# Patient Record
Sex: FEMALE | Race: BLACK OR AFRICAN AMERICAN | NOT HISPANIC OR LATINO | URBAN - NONMETROPOLITAN AREA
[De-identification: names, ages, dates, MRNs, and addresses within clinical notes are randomized per-mention and may not be internally consistent; named-entity substitution may affect disease eponyms.]

---

## 2020-05-16 ENCOUNTER — IMPORTED ENCOUNTER (OUTPATIENT)
Dept: URBAN - NONMETROPOLITAN AREA CLINIC 1 | Facility: CLINIC | Age: 45
End: 2020-05-16

## 2020-05-16 PROBLEM — H52.4: Noted: 2020-05-16

## 2020-05-16 PROBLEM — G35: Noted: 2020-05-16

## 2020-05-16 PROBLEM — H20.13: Noted: 2020-05-16

## 2020-05-16 PROCEDURE — S0620 ROUTINE OPHTHALMOLOGICAL EXA: HCPCS

## 2020-05-16 NOTE — PATIENT DISCUSSION
Multiple Sclerosis - Discussed diagnosis in detail with patient - Discussed signs and symptoms associated - Patient states she had an episode of vision loss prior to being diagnosed with MS about 10-12 years ago. States she never saw an eye doctor at that time.  - Discussed with patient that it sounds like she had an episode of optic neuritis- Patient is seeing Dr. Everett Rodriguez in [facility]IENT[facility], [facility] Neurologist) will send notes- Continue to montior Possible Chronic Granulomatous Uveitis OU- Discussed diagnosis in detail with patient - Discussed signs and symptoms associated - Discussed with patient that I'm not sure if this is related to her MS or to something else but it appears that she has been having a  chronic problem- Mutton Fat KP's noted OU today - Patient states she has been experiencing light sensitivity for at least a year or so off and on- May consider treating with topical steroid but patient is not active at this time and denies ever experiencing redness associated w/ the light sensitivity - Patients primary care is through [address]- Suggest blood test/uveitic workup - MARIANA FTA RPR [id number] chest X-Ray PPD- Continue to montior Presbyopia OU - Discussed refractive status in detail - New glasses Rx given today - Continue to monitor

## 2020-06-29 ENCOUNTER — IMPORTED ENCOUNTER (OUTPATIENT)
Dept: URBAN - NONMETROPOLITAN AREA CLINIC 1 | Facility: CLINIC | Age: 45
End: 2020-06-29

## 2020-06-29 PROBLEM — H40.013: Noted: 2020-06-29

## 2020-06-29 PROBLEM — H20.13: Noted: 2020-06-29

## 2020-06-29 PROBLEM — G35: Noted: 2020-06-29

## 2020-06-29 PROBLEM — H52.4: Noted: 2020-06-29

## 2020-06-29 PROCEDURE — 99213 OFFICE O/P EST LOW 20 MIN: CPT

## 2020-06-29 PROCEDURE — 92133 CPTRZD OPH DX IMG PST SGM ON: CPT

## 2020-06-29 NOTE — PATIENT DISCUSSION
Possible Chronic Granulomatous Uveitis OU- Discussed diagnosis in detail with patient - Discussed signs and symptoms associated - Discussed with patient that I'm not sure if this is related to her MS or to something else but it appears that she has been having a  chronic problem- Mutton Fat KP's noted OU today - No papalliedema seen on today's exam- Patient states she has been experiencing light sensitivity for at least a year or so off and on- Patients primary care is through [address] [place] [address] blood test/uveitic workup - MARIANA FTA RPR [id number] chest X-Ray PPD- OCT done today shows Borderline Superior and Temporal NFL thinning amd OS shows Borderline Superior Temporal and Inferior NFL thinning - Start Pred Forte QID OU x 1 week then taper to TID x 1 week BID x 1 week QD x 1 week then stop - Continue to montior Borderline Glaucoma OU- Discussed diagnosis in detail with patient- IOP today 10 OU- Cup to Disc noted at OD . 65 and OS . 6 - Continue to monitorMultiple Sclerosis - Discussed diagnosis in detail with patient - Discussed signs and symptoms associated - Patient states she had an episode of vision loss prior to being diagnosed with MS about 10-12 years ago. States she never saw an eye doctor at that time.  - Discussed with patient that it sounds like she had an episode of optic neuritis- Patient is seeing Dr. Soco Sue in [facility], [facility] Neurologist) will send notes- Continue to montior Presbyopia OU - Discussed refractive status in detail - New glasses Rx given today - Continue to monitor; 's Notes: KENYA Zhang 74 6/29/20

## 2020-07-10 ENCOUNTER — IMPORTED ENCOUNTER (OUTPATIENT)
Dept: URBAN - NONMETROPOLITAN AREA CLINIC 1 | Facility: CLINIC | Age: 45
End: 2020-07-10

## 2020-07-10 PROCEDURE — 92012 INTRM OPH EXAM EST PATIENT: CPT

## 2020-07-10 NOTE — PATIENT DISCUSSION
Possible Chronic Granulomatous Uveitis OU- Discussed diagnosis in detail with patient - Discussed signs and symptoms associated - Discussed with patient that I'm not sure if this is related to her MS or to something else but it appears that she has been having a  chronic problem- Mutpam Fat KP's noted OU today resolving- No papalliedema seen on today's exam- Patient states she has been experiencing light sensitivity for at least a year or so off and on- Patients primary care is through [address] [place] [address] blood test/uveitic workup - MARIANA FTA RPR [id number] chest X-Ray PPD- Continue Pred Forte BID OU x 1 week then taper to QD x 1 week- Continue to montior - RTC in 2 weeks for follow up with Dr. Christell Goldberg- Discussed diagnosis in detail with patient- IOP today 17 OD and 18 OS. - Cup to Disc noted at OD . 65 and OS . 6 - Continue to monitorMultiple Sclerosis - Discussed diagnosis in detail with patient - Discussed signs and symptoms associated - Patient states she had an episode of vision loss prior to being diagnosed with MS about 10-12 years ago. States she never saw an eye doctor at that time.  - Discussed with patient that it sounds like she had an episode of optic neuritis- Patient is seeing Dr. Noemy Lebron in [facility], [facility] Neurologist) will send notes- Continue to montior Presbyopia OU - Discussed refractive status in detail - Continue to monitor; 's Notes: KENYA Zhang 74 6/29/20

## 2020-07-23 ENCOUNTER — IMPORTED ENCOUNTER (OUTPATIENT)
Dept: URBAN - NONMETROPOLITAN AREA CLINIC 1 | Facility: CLINIC | Age: 45
End: 2020-07-23

## 2020-07-23 PROCEDURE — 99213 OFFICE O/P EST LOW 20 MIN: CPT

## 2020-07-23 NOTE — PATIENT DISCUSSION
Possible Chronic Granulomatous Uveitis OU- Discussed diagnosis in detail with patient - Discussed signs and symptoms associated - Discussed with patient that I'm not sure if this is related to her MS or to something else but it appears that she has been having a  chronic problem- Mutton Fat KP's noted OU today resolving- No papalliedema seen on today's exam- Patient states she has been experiencing light sensitivity for at least a year or so off and on- Patients primary care is through [address] [place] [address] blood test/uveitic workup - MARIANA FTA RPR [id number] chest X-Ray PPD- Continue Pred Forte BID OU until Monday then taper to QD x 2 weeks - Patient had MRI is being done tomorrow will call and speak to Dr. Enid Tejada next week - Continue to montior Borderline Glaucoma OU- Discussed diagnosis in detail with patient- IOP today 17 OD and 17 OS. - Cup to Disc noted at OD . 65 and OS . 6 - Continue to monitorMultiple Sclerosis - Discussed diagnosis in detail with patient - Discussed signs and symptoms associated - Patient states she had an episode of vision loss prior to being diagnosed with MS about 10-12 years ago. States she never saw an eye doctor at that time.  - Discussed with patient that it sounds like she had an episode of optic neuritis- Patient is seeing Dr. Aleah Barba in [facility], [facility] Neurologist) will send notes- Continue to montior Presbyopia OU - Discussed refractive status in detail - Continue to monitor; 's Notes: [facility] 6/29/20

## 2022-04-09 ASSESSMENT — VISUAL ACUITY
OS_CC: 20/22
OD_CC: 20/22
OS_SC: 20/20-1
OD_CC: 20/29+
OS_CC: 20/30-
OD_SC: 20/22
OD_CC: 20/30
OS_CC: 20/29+

## 2022-04-09 ASSESSMENT — TONOMETRY
OS_IOP_MMHG: 17
OS_IOP_MMHG: 17
OD_IOP_MMHG: 10
OS_IOP_MMHG: 18
OD_IOP_MMHG: 17
OS_IOP_MMHG: 10